# Patient Record
Sex: MALE | Race: OTHER
[De-identification: names, ages, dates, MRNs, and addresses within clinical notes are randomized per-mention and may not be internally consistent; named-entity substitution may affect disease eponyms.]

---

## 2022-05-22 ENCOUNTER — HOSPITAL ENCOUNTER (EMERGENCY)
Dept: HOSPITAL 61 - ER | Age: 47
Discharge: HOME | End: 2022-05-22
Payer: COMMERCIAL

## 2022-05-22 VITALS — BODY MASS INDEX: 27.78 KG/M2 | HEIGHT: 71 IN | WEIGHT: 198.42 LBS

## 2022-05-22 VITALS — DIASTOLIC BLOOD PRESSURE: 79 MMHG | SYSTOLIC BLOOD PRESSURE: 115 MMHG
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
## 2022-05-22 DIAGNOSIS — S86.011A: Primary | ICD-10-CM

## 2022-05-22 DIAGNOSIS — Z90.49: ICD-10-CM

## 2022-05-22 PROCEDURE — 29515 APPLICATION SHORT LEG SPLINT: CPT

## 2022-05-22 PROCEDURE — 73610 X-RAY EXAM OF ANKLE: CPT

## 2022-05-22 PROCEDURE — 99283 EMERGENCY DEPT VISIT LOW MDM: CPT

## 2022-05-22 NOTE — RAD
Exam: Right ankle 3 views



INDICATION: Fall, felt a pop ankle while playing basketball



TECHNIQUE: Frontal, lateral and oblique views the right ankle



Comparisons: None



FINDINGS:

Bone mineralization is normal. No acute or healed fractures. Soft tissues are unremarkable. Joint spa
colleen are well-maintained.



IMPRESSION:

No acute osseous abnormality



Electronically signed by: Sanjay Lo MD (5/22/2022 8:44 PM) [facility]

## 2022-05-22 NOTE — PHYS DOC
Past Medical History


Past Medical History:  Cancer


Past Surgical History:  Colectomy





General Adult


EDM:


Chief Complaint:  LOWER EXT PAIN





HPI:


HPI:





Patient is a 47-year-old male who presents today with right ankle pain.  Patient

states that he was outside playing basketball with some friends, and he said he 

felt a pop in the back of his right lower leg near the heel area, he said since 

that time he has had difficulty walking and increased pain and swelling in that 

area.  Patient states he is able to walk but is quite difficult due to the pain,

patient does have a past medical history of colon cancer and a colostomy.





Review of Systems:


Review of Systems:


Constitutional:   Denies fever or chills. []


Eyes:   Denies change in visual acuity. []


HENT:   Denies nasal congestion or sore throat. [] 


Respiratory:   Denies cough or shortness of breath. [] 


Cardiovascular:   Denies chest pain or edema. [] 


GI:   Denies abdominal pain, nausea, vomiting, bloody stools or diarrhea. [] 


:  Denies dysuria. [] 


Musculoskeletal:   Right lower leg pain 


Integument:   Denies rash. [] 


Neurologic:   Denies headache, focal weakness or sensory changes. [] 


Endocrine:   Denies polyuria or polydipsia. [] 


Lymphatic:  Denies swollen glands. [] 


Psychiatric:  Denies depression or anxiety. []





Heart Score:


C/O Chest Pain:  No


Risk Factors:


Risk Factors:  DM, Current or recent (<one month) smoker, HTN, HLP, family 

history of CAD, obesity.


Risk Scores:


Score 0 - 3:  2.5% MACE over next 6 weeks - Discharge Home


Score 4 - 6:  20.3% MACE over next 6 weeks - Admit for Clinical Observation


Score 7 - 10:  72.7% MACE over next 6 weeks - Early Invasive Strategies





Current Medications:





Current Medications








 Medications


  (Trade)  Dose


 Ordered  Sig/Paige  Start Time


 Stop Time Status Last Admin


Dose Admin


 


 Ibuprofen


  (Motrin)  600 mg  1X  ONCE  5/22/22 18:30


 5/22/22 18:31 UNV  














Physical Exam:


PE:





Constitutional: Well developed, well nourished, no acute distress, non-toxic 

appearance. []


HENT: Normocephalic, atraumatic, bilateral external ears normal, oropharynx 

moist, no oral exudates, nose normal. []


Eyes: PERRLA, EOMI, conjunctiva normal, no discharge. [] 


Neck: Normal range of motion, no tenderness, supple, no stridor. [] 


Cardiovascular:Heart rate regular rhythm, no murmur []


Lungs & Thorax:  Bilateral breath sounds clear to auscultation []


Abdomen: Bowel sounds normal, soft, no tenderness, no masses, no pulsatile 

masses. [] 


Skin: Warm, dry, no erythema, no rash. [] 


Back: No tenderness, no CVA tenderness. [] 


Extremities: Right lower extremity in the heel area is painful and tender to 

touch, patient also has swelling, patient's Jara test is abnormal on the 

right side, patient is able to slightly flex and extend foot but is with great 

pain.  Sensory distal to the injury is intact cap refill is less than 2 seconds 

and dorsalis pedis pulse is 2+.  


Neurologic: Alert and oriented X 3, normal motor function, normal sensory 

function, no focal deficits noted. []


Psychologic: Affect normal, judgement normal, mood normal. []





Current Patient Data:


Vital Signs:





Vital Signs








  Date Time  Temp Pulse Resp B/P (MAP) Pulse Ox O2 Delivery O2 Flow Rate FiO2


 


5/22/22 17:30 98.7 84 16 115/79 (91) 99 Room Air  





 98.7       











EKG:


EKG:


[]





Radiology/Procedures:


Radiology/Procedures:


REASON: felt pop in ankle while playing basketball


PROCEDURE: ANKLE RIGHT 3V





Exam: Right ankle 3 views





INDICATION: Fall, felt a pop ankle while playing basketball





TECHNIQUE: Frontal, lateral and oblique views the right ankle





Comparisons: None





FINDINGS:


Bone mineralization is normal. No acute or healed fractures. Soft tissues are 

unremarkable. Joint spaces are well-maintained.





IMPRESSION:


No acute osseous abnormality





Electronically signed by: Sanjay Lo MD (5/22/2022 8:44 PM) [facility]








[]





Course & Med Decision Making:


Course & Med Decision Making


Pertinent Labs and Imaging studies reviewed. (See chart for details)





1925 I spoke to Dr. Herrera from the orthopedic group he recommends splinting 

the patient with a short leg splint with the ankle slightly plantarflexed, and 

place the patient on crutches with nonweightbearing at this time.  Patient is to

 call Dr. Herrera's office in the a.m. for an appointment as soon as possible 

for further evaluation and management of this.  Patient will be instructed to 

ice, take Motrin or Tylenol or hydrocodone as needed for pain and to follow-up 

soon as possible.





1950 Short right lower leg splint in place by nursing staff, I did check the 

splint after it was placed neurovascular is intact with less than 2-second cap 

refill patient is able to move his toes and has good sensory in his toes.





Dragon Disclaimer:


Dragon Disclaimer:


This electronic medical record was generated, in whole or in part, using a voice

 recognition dictation system.





Departure


Departure


Impression:  


   Primary Impression:  


   Achilles rupture, right


   Qualified Codes:  S86.011A - Strain of right Achilles tendon, initial 

   encounter


Disposition:  01 HOME / SELF CARE / HOMELESS


Condition:  STABLE


Referrals:  


AVA HERRERA Jr. DO


Patient Instructions:  Achilles Tendon Rupture (Partial, Incomplete), Crutch Use





Additional Instructions:  


Keep splint clean and dry


Use crutches do not weight-bear on your right leg


Ice 20 minutes on 3-4 times daily


Motrin 600 mg take 1 tablet every 6 hours as needed for mild to moderate pain


Hydrocodone 1 tablet every 6 hours as needed for moderate to severe pain, use 

with caution may cause drowsiness


Follow-up with Dr. Herrera as soon as possible for further evaluation and 

management of this injury.


Scripts


Hydrocodone Bit/Acetaminophen (HYDROCODONE-APAP 5-325  **) 1 Tab Tablet


1 TAB PO PRN Q6HRS PRN for PAIN, #28 TAB 0 Refills


   Prov: IGNACIO BERG APRJANKI         5/22/22 


Ibuprofen (IBUPROFEN) 600 Mg Tablet


600 MG PO PRN Q6HRS PRN for INFLAMMATION, #30 TAB


   Prov: IGNACIO BERG APRN         5/22/22











IGNACIO BERG APRJANKI       May 22, 2022 18:40